# Patient Record
Sex: FEMALE | Race: WHITE | URBAN - METROPOLITAN AREA
[De-identification: names, ages, dates, MRNs, and addresses within clinical notes are randomized per-mention and may not be internally consistent; named-entity substitution may affect disease eponyms.]

---

## 2018-01-18 ENCOUNTER — OFFICE VISIT (OUTPATIENT)
Dept: ORTHOPEDICS | Facility: CLINIC | Age: 63
End: 2018-01-18
Payer: OTHER MISCELLANEOUS

## 2018-01-18 ENCOUNTER — RADIANT APPOINTMENT (OUTPATIENT)
Dept: GENERAL RADIOLOGY | Facility: CLINIC | Age: 63
End: 2018-01-18
Attending: FAMILY MEDICINE
Payer: OTHER MISCELLANEOUS

## 2018-01-18 ENCOUNTER — THERAPY VISIT (OUTPATIENT)
Dept: PHYSICAL THERAPY | Facility: CLINIC | Age: 63
End: 2018-01-18
Payer: OTHER MISCELLANEOUS

## 2018-01-18 VITALS
HEIGHT: 62 IN | BODY MASS INDEX: 23 KG/M2 | DIASTOLIC BLOOD PRESSURE: 80 MMHG | SYSTOLIC BLOOD PRESSURE: 145 MMHG | WEIGHT: 125 LBS

## 2018-01-18 DIAGNOSIS — M62.838 MUSCLE SPASM: ICD-10-CM

## 2018-01-18 DIAGNOSIS — M54.50 ACUTE MIDLINE LOW BACK PAIN WITHOUT SCIATICA: Primary | ICD-10-CM

## 2018-01-18 DIAGNOSIS — M54.5 ACUTE MIDLINE LOW BACK PAIN, WITH SCIATICA PRESENCE UNSPECIFIED: Primary | ICD-10-CM

## 2018-01-18 DIAGNOSIS — M54.5 ACUTE MIDLINE LOW BACK PAIN, WITH SCIATICA PRESENCE UNSPECIFIED: ICD-10-CM

## 2018-01-18 DIAGNOSIS — Z02.6 ENCOUNTER RELATED TO WORKER'S COMPENSATION CLAIM: ICD-10-CM

## 2018-01-18 PROCEDURE — 97112 NEUROMUSCULAR REEDUCATION: CPT | Mod: GP | Performed by: PHYSICAL THERAPIST

## 2018-01-18 PROCEDURE — 97161 PT EVAL LOW COMPLEX 20 MIN: CPT | Mod: GP | Performed by: PHYSICAL THERAPIST

## 2018-01-18 PROCEDURE — 72100 X-RAY EXAM L-S SPINE 2/3 VWS: CPT

## 2018-01-18 PROCEDURE — 99204 OFFICE O/P NEW MOD 45 MIN: CPT | Performed by: FAMILY MEDICINE

## 2018-01-18 PROCEDURE — 97110 THERAPEUTIC EXERCISES: CPT | Mod: GP | Performed by: PHYSICAL THERAPIST

## 2018-01-18 RX ORDER — METHYLPREDNISOLONE 4 MG
TABLET, DOSE PACK ORAL
Qty: 21 TABLET | Refills: 0 | Status: SHIPPED | OUTPATIENT
Start: 2018-01-18 | End: 2018-01-26

## 2018-01-18 RX ORDER — CYCLOBENZAPRINE HCL 5 MG
5 TABLET ORAL
Qty: 15 TABLET | Refills: 0 | Status: SHIPPED | OUTPATIENT
Start: 2018-01-18

## 2018-01-18 RX ORDER — SIMVASTATIN 40 MG
TABLET ORAL
COMMUNITY
Start: 2018-01-03

## 2018-01-18 RX ORDER — METOPROLOL SUCCINATE 100 MG/1
TABLET, EXTENDED RELEASE ORAL
COMMUNITY
Start: 2017-11-01

## 2018-01-18 NOTE — PROGRESS NOTES
ASSESSMENT & PLAN    1. Acute midline low back pain, with sciatica presence unspecified    2. Muscle spasm    3. Encounter related to worker's compensation claim      Physical therapy: [facility] - [phone]  Discussed exam - does not appear discogenic  Rx for medrol/steroid. Take in the AM and with food. No other anti-inflammatories (Ibuprofen, Advil, Aleve, Motrin) while you're taking this.  Rx for Flexeril. Take at night. No alcohol / driving while taking.  Letter for work: light duty - no lifting, bending or patient transfers    Follow up in one week. Call direct clinic number [[phone]] at any time with questions or concerns.    -----    SUBJECTIVE  Taisha Jaimes is a/an 62 year old female who is seen as a self referral for evaluation of low back pain. The patient is seen by themselves.    Onset: 5 day(s) ago. Patient describes injury as she was reaching around a patient to dispose of a medication in the sharp container and felt a pain in her back  Location of Pain:  Midline low back pain with radiation to the abdomen  Rating of Pain at worst: 5/10  Rating of Pain Currently: 4/10  Worsened by: sitting, lifting, bending  Better with: standing  Treatments tried: Tylenol and epsom salt soaks  Quality: aching, radiating  Red flags: Weakness: No, bowel/bladder loss: No, foot drop: No  Associated symptoms: tingling in bilateral lateral thigh  Orthopedic history: NO  Relevant surgical history: NO  Patient Social History: works as an RN    [place] [facility] [place]     Patient's past medical, surgical, social, and family histories were reviewed today and no changes are noted.    REVIEW OF SYSTEMS:  10 point ROS is negative other than symptoms noted above in HPI, Past Medical History or as stated below  Constitutional: NEGATIVE for fever, chills, change in weight  Skin: NEGATIVE for worrisome rashes, moles or lesions  GI/: NEGATIVE for bowel or bladder changes  Neuro:  "NEGATIVE for weakness, dizziness or paresthesias    OBJECTIVE:  /80  Ht 5' 2\" (1.575 m)  Wt 125 lb (56.7 kg)  BMI 22.86 kg/m2   General: healthy, alert and in no distress  HEENT: no scleral icterus or conjunctival erythema  Skin: no suspicious lesions or rash. No jaundice.  CV: no pedal edema  Resp: normal respiratory effort without conversational dyspnea   Psych: normal mood and affect  Gait: normal steady gait with appropriate coordination and balance  Neuro: normal light touch sensory exam of the bilateral lower extremities.  DTR's 2+ patella and achilles bilaterally.  MSK:  THORACIC/LUMBAR SPINE  Inspection:    No gross deformity/asymmetry  Palpation:    Tender about the lumbar facet joints, left para lumbar muscles, right para lumbar muscles, right SI joint and right sciatic notch. Otherwise remainder of landmarks are nontender.  Range of Motion:     Lumbar flexion limited by pain    Lumbar extension limited by pain  Strength:    able to heel walk, able to toe walk, quadriceps 5/5, hamstrings 5/5, gastrocsoleus 5/5, tibialis anterior 5/5, extensor hallicus longus 5/5  Special Tests:    Negative: slump test (bilateral)    Independent visualization of the below image:  XR LUMBAR SPINE  Well preserved disc space, no spondy.  Final radiology read pending    Patient's conditions were thoroughly discussed during today's visit with greater than 50% of the visit spent counseling the patient with total time spent face-to-face with the patient being 20 minutes.    Aliya Salas, DO [facility]    "

## 2018-01-18 NOTE — LETTER
[facility]  [address]  [address]  [place] [address]  Phone: [phone]  Fax: [phone]    January 18, 2018    Taisha Jaimes  1955  [address]  [place] [address]    Employer: [employer]  Date of Accident: 1/16/18  Work Related:  yes    To Whom It May Concern:    Taisha was seen in clinic today for a back pain related to a work injury. .     Taisha is ABLE to return to work with the following restrictions that are valid from 1/18/18 to 1/29/18:    A) Bend: Not at all (0 hours)  B) Squat: Not at all (0 hours)  C) Walk/Stand: Occasionally (1-3 hours)  D) Reach Above Shoulders: Occasionally (1-3 hours)  E) Lift, carry, push, and pull no more than:  0-10 lbs.    Other restrictions: No patient transfers    Taisha will schedule to follow up in clinic prior to the above mentioned end date for re-evaluation of and review of work restrictions.    Please feel free to contact myself or my Clinic Coordinator, Roel Mahoney, with any questions or concerns at the above number.    Sincerely,        Aliya Salas DO, [facility] Mahoney ATC on behalf of Dr. Salas

## 2018-01-18 NOTE — LETTER
1/18/2018         RE: Taisha Jaimes  [address]  [place] [address]        Dear Colleague,    Thank you for referring your patient, Taisha Jaimes, to the [facility]. Please see a copy of my visit note below.    ASSESSMENT & PLAN    1. Acute midline low back pain, with sciatica presence unspecified    2. Muscle spasm    3. Encounter related to worker's compensation claim      Physical therapy: [facility] - [phone]  Discussed exam - does not appear discogenic  Rx for medrol/steroid. Take in the AM and with food. No other anti-inflammatories (Ibuprofen, Advil, Aleve, Motrin) while you're taking this.  Rx for Flexeril. Take at night. No alcohol / driving while taking.  Letter for work: light duty - no lifting, bending or patient transfers    Follow up in one week. Call direct clinic number [[phone]] at any time with questions or concerns.    -----    SUBJECTIVE  Taisha Jaimes is a/an 62 year old female who is seen as a self referral for evaluation of low back pain. The patient is seen by themselves.    Onset: 5 day(s) ago. Patient describes injury as she was reaching around a patient to dispose of a medication in the sharp container and felt a pain in her back  Location of Pain:  Midline low back pain with radiation to the abdomen  Rating of Pain at worst: 5/10  Rating of Pain Currently: 4/10  Worsened by: sitting, lifting, bending  Better with: standing  Treatments tried: Tylenol and epsom salt soaks  Quality: aching, radiating  Red flags: Weakness: No, bowel/bladder loss: No, foot drop: No  Associated symptoms: tingling in bilateral lateral thigh  Orthopedic history: NO  Relevant surgical history: NO  Patient Social History: works as an RN    Was seen at [facility] over the weekend.     Patient's past medical, surgical, social, and family histories were reviewed today and no changes are noted.    REVIEW OF SYSTEMS:  10 point ROS is negative  "other than symptoms noted above in HPI, Past Medical History or as stated below  Constitutional: NEGATIVE for fever, chills, change in weight  Skin: NEGATIVE for worrisome rashes, moles or lesions  GI/: NEGATIVE for bowel or bladder changes  Neuro: NEGATIVE for weakness, dizziness or paresthesias    OBJECTIVE:  /80  Ht 5' 2\" (1.575 m)  Wt 125 lb (56.7 kg)  BMI 22.86 kg/m2   General: healthy, alert and in no distress  HEENT: no scleral icterus or conjunctival erythema  Skin: no suspicious lesions or rash. No jaundice.  CV: no pedal edema  Resp: normal respiratory effort without conversational dyspnea   Psych: normal mood and affect  Gait: normal steady gait with appropriate coordination and balance  Neuro: normal light touch sensory exam of the bilateral lower extremities.  DTR's 2+ patella and achilles bilaterally.  MSK:  THORACIC/LUMBAR SPINE  Inspection:    No gross deformity/asymmetry  Palpation:    Tender about the lumbar facet joints, left para lumbar muscles, right para lumbar muscles, right SI joint and right sciatic notch. Otherwise remainder of landmarks are nontender.  Range of Motion:     Lumbar flexion limited by pain    Lumbar extension limited by pain  Strength:    able to heel walk, able to toe walk, quadriceps 5/5, hamstrings 5/5, gastrocsoleus 5/5, tibialis anterior 5/5, extensor hallicus longus 5/5  Special Tests:    Negative: slump test (bilateral)    Independent visualization of the below image:  XR LUMBAR SPINE  Well preserved disc space, no spondy.  Final radiology read pending    Patient's conditions were thoroughly discussed during today's visit with greater than 50% of the visit spent counseling the patient with total time spent face-to-face with the patient being 20 minutes.    Aliya Salas DO [facility]      Again, thank you for allowing me to participate in the care of your patient.        Sincerely,        Aliya Salas, DO    "

## 2018-01-18 NOTE — LETTER
[facility]  [address]  [address]  [place] [address]  Phone: [phone]  Fax: [phone]    January 18, 2018    Taisha Jaimes  1955  [address]  [place] [address]    Employer: [employer]  Date of Accident: 1/16/18  Work Related:  yes    To Whom It May Concern:    Taisha was seen in clinic today for a back pain related to a work injury. .     Taisha is ABLE to return to work with the following restrictions that are valid from 1/18/15 to 1/29/18:    A) Bend: Not at all (0 hours)  B) Squat: Not at all (0 hours)  C) Walk/Stand: Occasionally (1-3 hours)  D) Reach Above Shoulders: Occasionally (1-3 hours)  E) Lift, carry, push, and pull no more than:  0-10 lbs.    Other restrictions: No patient transfers    Taisha will schedule to follow up in clinic prior to the above mentioned end date for re-evaluation of and review of work restrictions.    Please feel free to contact myself or my Clinic Coordinator, Roel Mahoney, with any questions or concerns at the above number.    Sincerely,        Aliya Salas DO, [employer] Mahoney ATC on behalf of Dr. Salas

## 2018-01-18 NOTE — PATIENT INSTRUCTIONS
1. Acute midline low back pain, with sciatica presence unspecified    2. Muscle spasm    3. Encounter related to worker's compensation claim      Physical therapy: [facility] - [phone]  Discussed exam - does not appear related to a herniated disc  Rx for medrol/steroid. Take in the AM and with food. No other anti-inflammatories (Ibuprofen, Advil, Aleve, Motrin) while you're taking this.  Rx for Flexeril. Take at night. No alcohol / driving while taking.  Letter for work: light duty - no lifting, bending or patient transfers    Follow up in one week. Call direct clinic number [[phone]] at any time with questions or concerns.

## 2018-01-18 NOTE — MR AVS SNAPSHOT
"              After Visit Summary   1/18/2018    Taisha Jaimes    MRN: [id number]           Patient Information     Date Of Birth          1955        Visit Information        Provider Department      1/18/2018 1:30 PM María Elena Houston, PT [facility]        Today's Diagnoses     Acute midline low back pain without sciatica    -  1       Follow-ups after your visit        Your next 10 appointments already scheduled     Jan 26, 2018 11:20 AM CST   MEDSPINE RTN with Aliya Salas,    [facility] ([facility])    [address]  [address]  [place] [address]   [phone]            Jan 26, 2018 12:50 PM CST   [facility] Sp[facility] with SHIRLENE Girard PT ([facility]  )    [address]  [address]  [place] [address] [phone]              Who to contact     If you have questions or need follow up information about today's clinic visit or your schedule please contact [facility] directly at [phone].  Normal or non-critical lab and imaging results will be communicated to you by FlowMetrichart, letter or phone within 4 business days after the clinic has received the results. If you do not hear from us within 7 days, please contact the clinic through FlowMetrichart or phone. If you have a critical or abnormal lab result, we will notify you by phone as soon as possible.  Submit refill requests through Hello [employer] or call your pharmacy and they will forward the refill request to us. Please allow 3 business days for your refill to be completed.          Additional Information About Your Visit        FlowMetrichart Information     Hello Music lets you send messages to your doctor, view your test results, renew your prescriptions, schedule appointments and more. To sign up, go to [email or web address] . Click on \"Log in\" on the left side of the screen, which will take you to the Welcome page. Then click on \"Sign up Now\" on the right side of the " page.     You will be asked to enter the access code listed below, as well as some personal information. Please follow the directions to create your username and password.     Your access code is: [email or web address]  Expires: 2018  2:43 PM     Your access code will  in 90 days. If you need help or a new code, please call your [facility] clinic or [phone].        Care EveryWhere ID     This is your Care EveryWhere ID. This could be used by other organizations to access your [facility] medical records  [id number]         Blood Pressure from Last 3 Encounters:   18 145/80    Weight from Last 3 Encounters:   18 56.7 kg (125 lb)              We Performed the Following     [facility] PT EVAL, LOW COMPLEXITY     [facility] INITIAL EVAL REPORT     NEUROMUSCULAR RE-EDUCATION     THERAPEUTIC EXERCISES          Today's Medication Changes          These changes are accurate as of 18 11:59 PM.  If you have any questions, ask your nurse or doctor.               Start taking these medicines.        Dose/Directions    cyclobenzaprine 5 MG tablet   Commonly known as:  FLEXERIL   Used for:  Muscle spasm   Started by:  Aliya Salas DO        Dose:  5 mg   Take 1 tablet (5 mg) by mouth nightly as needed for muscle spasms   Quantity:  15 tablet   Refills:  0       methylPREDNISolone 4 MG tablet   Commonly known as:  MEDROL DOSEPAK   Used for:  Acute midline low back pain, with sciatica presence unspecified, Muscle spasm   Started by:  Aliya Salas DO        Follow package instructions   Quantity:  21 tablet   Refills:  0            Where to get your medicines      These medications were sent to [facility] [address] [address]     Phone:  [phone]     cyclobenzaprine 5 MG tablet    methylPREDNISolone 4 MG tablet                Primary Care Provider Office Phone # Fax #    Rylee James [facility] [phone]        [address] [address]        Equal Access to Services     STEPHANIE RASHEED : Hadii aad ku hadnorahtiny Saulali, waizabelda annmarievanessaha, qajulianata hetalkirbyradha millan, francisco demellyjenny rossi. So [facility] [phone].    ATENCIÓN: Si habla español, tiene a morales disposición servicios gratuitos de asistencia lingüística. [facility] [phone].    We comply with applicable federal civil rights laws and [place] laws. We do not discriminate on the basis of race, color, national origin, age, disability, sex, sexual orientation, or gender identity.            Thank you!     Thank you for choosing [facility]  for your care. Our goal is always to provide you with excellent care. Hearing back from our patients is one way we can continue to improve our services. Please take a few minutes to complete the written survey that you may receive in the mail after your visit with us. Thank you!             Your Updated Medication List - Protect others around you: Learn how to safely use, store and throw away your medicines at www.disposemymeds.org.          This list is accurate as of 1/18/18 11:59 PM.  Always use your most recent med list.                   Brand Name Dispense Instructions for use Diagnosis    cyclobenzaprine 5 MG tablet    FLEXERIL    15 tablet    Take 1 tablet (5 mg) by mouth nightly as needed for muscle spasms    Muscle spasm       methylPREDNISolone 4 MG tablet    MEDROL DOSEPAK    21 tablet    Follow package instructions    Acute midline low back pain, with sciatica presence unspecified, Muscle spasm       metoprolol succinate 100 MG 24 hr tablet    TOPROL-XL          ranitidine 300 MG tablet    ZANTAC          simvastatin 40 MG tablet    ZOCOR

## 2018-01-18 NOTE — LETTER
[facility]  [address]  [address]  [place] [address]  Phone: [phone]  Fax: [phone]    January 18, 2018    Taisha Jaimes  1955  [address]  [place] [address]    Employer: [employer]  Date of Accident: 1/13/18  Work Related:  yes    To Whom It May Concern:    Taisha was seen in clinic today for back pain related to a work injury.    Taisha is ABLE to return to work with the following restrictions that are valid from 1/18/18 to 1/29/18:    A) Bend: Not at all (0 hours)  B) Squat: Not at all (0 hours)  C) Walk/Stand: Occasionally (1-3 hours)  D) Reach Above Shoulders: Occasionally (1-3 hours)  E) Lift, carry, push, and pull no more than:  0-10 lbs.    Other restrictions: No patient transfers    Taisha will schedule follow up in clinic prior to the above mentioned end date for re-evaluation and review of work restrictions.    Please feel free to contact myself or my Clinic Coordinator, Roel Mahoney, with any questions or concerns at the above number.    Sincerely,        Aliya Salas DO, [employer] Mahoney ATC on behalf of Dr. Salas

## 2018-01-18 NOTE — MR AVS SNAPSHOT
After Visit Summary   1/18/2018    Taisha Jaimes    MRN: [id number]           Patient Information     Date Of Birth          1955        Visit Information        Provider Department      1/18/2018 1:40 PM Aliya Salas,  [facility]        Today's Diagnoses     Acute midline low back pain, with sciatica presence unspecified    -  1    Muscle spasm        Encounter related to worker's compensation claim          Care Instructions    1. Acute midline low back pain, with sciatica presence unspecified    2. Muscle spasm    3. Encounter related to worker's compensation claim      Physical therapy: [facility] - [phone]  Discussed exam - does not appear related to a herniated disc  Rx for medrol/steroid. Take in the AM and with food. No other anti-inflammatories (Ibuprofen, Advil, Aleve, Motrin) while you're taking this.  Rx for Flexeril. Take at night. No alcohol / driving while taking.  Letter for work: light duty - no lifting, bending or patient transfers    Follow up in one week. Call direct clinic number [[phone]] at any time with questions or concerns.              Follow-ups after your visit        Additional Services     [facility] PT, HAND, AND CHIROPRACTIC REFERRAL       **This order will print in the [facility] Scheduling Office**    Physical Therapy, Hand Therapy and Chiropractic Care are available through:    *[facility]  *[facility]  *[facility]    Call one number to schedule at any of the above locations: [phone].    Your provider has referred you to: Physical Therapy at [facility] or [facility]    Indication/Reason for Referral: Midline low Back Pain - no radic  Onset of Illness: see chart  Therapy Orders: Evaluate and Treat  Special Programs: None  Special Request: MDT PT please - María Elena Houston, Xuan Lea, Loulou Franz or Xuan Cintron      Additional Comments for the Therapist  "or Chiropractor: Formal physical therapy - specific exercises to include centralization with flexion/extension activities with mobilization/manipulation and core stabilization with use of modalities (ie ice, ultrasound, electrical stimulation, etc.) as needed with home exercise prescription.    Please be aware that coverage of these services is subject to the terms and limitations of your health insurance plan.  Call member services at your health plan with any benefit or coverage questions.      Please bring the following to your appointment:    *Your personal calendar for scheduling future appointments  *Comfortable clothing                  Who to contact     If you have questions or need follow up information about today's clinic visit or your schedule please contact [facility]IC[facility] directly at [phone].  Normal or non-critical lab and imaging results will be communicated to you by Tradesparqhart, letter or phone within 4 business days after the clinic has received the results. If you do not hear from us within 7 days, please contact the clinic through Tradesparqhart or phone. If you have a critical or abnormal lab result, we will notify you by phone as soon as possible.  Submit refill requests through EffiCity or call your pharmacy and they will forward the refill request to us. Please allow 3 business days for your refill to be completed.          Additional Information About Your Visit        Tradesparqhart Information     EffiCity lets you send messages to your doctor, view your test results, renew your prescriptions, schedule appointments and more. To sign up, go to [email or web address] . Click on \"Log in\" on the left side of the screen, which will take you to the Welcome page. Then click on \"Sign up Now\" on the right side of the page.     You will be asked to enter the access code listed below, as well as some personal information. Please follow the directions to create your username and password.   " "  Your access code is: [email or web address]  Expires: 2018  2:43 PM     Your access code will  in 90 days. If you need help or a new code, please call your [place] clinic or [phone].        Care EveryWhere ID     This is your Care EveryWhere ID. This could be used by other organizations to access your [place] medical records  [id number]        Your Vitals Were     Height BMI (Body Mass Index)                5' 2\" (1.575 m) 22.86 kg/m2           Blood Pressure from Last 3 Encounters:   18 145/80    Weight from Last 3 Encounters:   18 125 lb (56.7 kg)              We Performed the Following     [facility] PT, HAND, AND CHIROPRACTIC REFERRAL          Today's Medication Changes          These changes are accurate as of: 18  2:45 PM.  If you have any questions, ask your nurse or doctor.               Start taking these medicines.        Dose/Directions    cyclobenzaprine 5 MG tablet   Commonly known as:  FLEXERIL   Used for:  Muscle spasm   Started by:  Aliya Salas DO        Dose:  5 mg   Take 1 tablet (5 mg) by mouth nightly as needed for muscle spasms   Quantity:  15 tablet   Refills:  0       methylPREDNISolone 4 MG tablet   Commonly known as:  MEDROL DOSEPAK   Used for:  Acute midline low back pain, with sciatica presence unspecified, Muscle spasm   Started by:  Aliya Salas DO        Follow package instructions   Quantity:  21 tablet   Refills:  0            Where to get your medicines      These medications were sent to [facility] [address] [address]     Phone:  [phone]     cyclobenzaprine 5 MG tablet    methylPREDNISolone 4 MG tablet                Primary Care Provider Office Phone # Fax #    [facility] [phone] [address] [facility] [address]        Equal Access to Services     STEPHANIE RASHEED AH: blanca Montalvo, " karel hetalal gilmerfrancisco horowitz damienin hayaan adeeg kharash la'aan ah. So [facility] [phone].    ATENCIÓN: Si jenni malik, tiene a morales disposición servicios gratuitos de asistencia lingüística. Gael al [phone].    We comply with applicable federal civil rights laws and [place] laws. We do not discriminate on the basis of race, color, national origin, age, disability, sex, sexual orientation, or gender identity.            Thank you!     Thank you for choosing [facility]  for your care. Our goal is always to provide you with excellent care. Hearing back from our patients is one way we can continue to improve our services. Please take a few minutes to complete the written survey that you may receive in the mail after your visit with us. Thank you!             Your Updated Medication List - Protect others around you: Learn how to safely use, store and throw away your medicines at www.disposemymeds.org.          This list is accurate as of: 1/18/18  2:45 PM.  Always use your most recent med list.                   Brand Name Dispense Instructions for use Diagnosis    cyclobenzaprine 5 MG tablet    FLEXERIL    15 tablet    Take 1 tablet (5 mg) by mouth nightly as needed for muscle spasms    Muscle spasm       methylPREDNISolone 4 MG tablet    MEDROL DOSEPAK    21 tablet    Follow package instructions    Acute midline low back pain, with sciatica presence unspecified, Muscle spasm       metoprolol succinate 100 MG 24 hr tablet    TOPROL-XL          ranitidine 300 MG tablet    ZANTAC          simvastatin 40 MG tablet    ZOCOR

## 2018-01-19 ENCOUNTER — THERAPY VISIT (OUTPATIENT)
Dept: PHYSICAL THERAPY | Facility: CLINIC | Age: 63
End: 2018-01-19
Payer: OTHER MISCELLANEOUS

## 2018-01-19 DIAGNOSIS — M54.50 ACUTE MIDLINE LOW BACK PAIN WITHOUT SCIATICA: Primary | ICD-10-CM

## 2018-01-19 PROCEDURE — 97110 THERAPEUTIC EXERCISES: CPT | Mod: GP | Performed by: PHYSICAL THERAPIST

## 2018-01-19 PROCEDURE — 97530 THERAPEUTIC ACTIVITIES: CPT | Mod: GP | Performed by: PHYSICAL THERAPIST

## 2018-01-19 NOTE — PROGRESS NOTES
[facility] Initial Evaluation -- Lumbar    Date: January 18, 2018  Taisha Jaimes is a 62 year old female with a lumbar condition.   Referral: Dr. Salas  Employment/Workability(Lost work days):  2  Formal Exerciser (Y/N):  Y  Leisure Mechanical Stresses: Walk, TM  Functional disability score (HANNAH/STarT Back):  See flow sheet  Visual Analog Score (VAS 0-10): 5/10  Patient goals/expectations:      HISTORY:    Present symptoms: central low back radiating to front/cramping  Pain quality (Sharp/shooting/stabbing/aching/burning/cramping):  cramping  Paresthesia (yes/no):  Yes bilateral thigh    DFO < 22 days (Y/N):  Y, 5 days.   Symptoms (improving/unchanging/worsening):  worsening.   Symptoms commenced as a result of: reaching up while leaning over     Symptoms at onset(back/thigh/leg): back  Constant symptoms(back/thigh/leg): back  Intermittent symptoms(back/thigh/leg): thighs/tingling    Symptoms are made worse with the following: Always Bending, Always Sitting, Time of day - No effect and Other - Lifting   Symptoms are made better with the following: Always Walking and Always On the move    Disturbed sleep (yes/no):  yes Sleeping postures (prone/sup/side R/L): sides    Previous episodes (0/1-5/6-10/11+): 0 Year of first episode: n/a    Previous history: none  Previous treatments: none      Specific Questions:  Cough/Sneeze/Strain (Pos/Neg): positive  Bowel/Bladder (Normal/Abnormal): normal  Gait (Normal/Abnormal): normal  Medications (Nil/NSAIDS/Analg/Steroids/Anticoag/Other):  NSAIDS, Muscle relaxants and Steroids  Medical allergies:  none  General Health (Excellent/Good/Fair/Poor):  good  Pertinent medical history:  Osteoporosis, Cancer, High blood pressure and Menopausal  Imaging (NA/Xray/MRI):  xrays  Recent or major surgery (Yes/No):  no  Night pain (Yes/No): n/a  Accidents (Yes/No): no  Unexplained weight loss (Yes/No): no  Barriers at home: none  Other red flags:  none    EXAMINATION    Posture:   Sitting(Good/Fair/Poor): fair/good  Standing(Good/Fair/Poor):fair  Lordosis (Red/Acc/Normal): acc  Correction of posture(Better/Worse/NE): better    Lateral Shift (Right/Left/Nil): nil  Relevant (Yes/No):  no  Other Observations: none    Neurological:    Motor Deficit:  n/a  Reflexes:  n/a  Sensory Deficit:  n/a  Dural Signs:  negative    Movement Loss:   Bernardino Mod Min Nil Pain   Flexion  x   Poor curve reversal   Extension   x     Side Gliding R   x     Side Gliding L   x       Test Movements:   During: produces, abolishes, increases, decreases, no effect, centralizing, peripheralizing   After: better, worse, no better, no worse, no effect, centralized, peripheralized    Pre-test Symptoms Standing:    Symptoms During Symptoms After ROM increased ROM decreased No Effect   FIS        Rep FIS        EIS        Rep EIS        Pre-test Symptoms Lying: central LBP    Symptoms During Symptoms After ROM increased ROM decreased No Effect   BERYL        Rep BERYL        EIL Decreases    Better         Rep EIL Decreases    Better      x   If required Pre-test Symptoms: not tested   Symptoms During Symptoms After ROM increased ROM decreased No Effect   SGIS - R        Rep SGIS - R        SGIS - L        Rep SGIS - L          Static Tests:  Sitting Slouched:    Sitting erect:    Standing Slouched   Standing erect:    Lying prone in extension:   Long sitting:      Other Tests:     Provisional Classification:  Inconclusive/Other - Mechanically Inconclusive    Principle of Management:  Education:  posture  Equipment provided:  Lumbar roll  Mechanical therapy (Y/N):  ?  Extension principle:  Rep EIL x 10/2 hrs  Lateral Principle:    Flexion principle:    Other:      ASSESSMENT/PLAN    Patient is a 62 year old female with lumbar complaints.    Patient has the following significant findings with corresponding treatment plan.                Diagnosis 1:  Mechanical LBP  Pain -  manual therapy, self  management, education, directional preference exercise and home program  Decreased ROM/flexibility - manual therapy and therapeutic exercise  Decreased function - therapeutic activities  Impaired posture - neuro re-education    Therapy Evaluation Codes:   1) History comprised of:   Personal factors that impact the plan of care:      None.    Comorbidity factors that impact the plan of care are:      None.     Medications impacting care: Steroids.  2) Examination of Body Systems comprised of:   Body structures and functions that impact the plan of care:      Lumbar spine.   Activity limitations that impact the plan of care are:      Bending, Lifting, Working and Sleeping.  3) Clinical presentation characteristics are:   Evolving/Changing.  4) Decision-Making    Low complexity using standardized patient assessment instrument and/or measureable assessment of functional outcome.  Cumulative Therapy Evaluation is: Low complexity.    Previous and current functional limitations:  (See Goal Flow Sheet for this information)    Short term and Long term goals: (See Goal Flow Sheet for this information)     Communication ability:  Patient appears to be able to clearly communicate and understand verbal and written communication and follow directions correctly.  Treatment Explanation - The following has been discussed with the patient:   RX ordered/plan of care  Anticipated outcomes  Possible risks and side effects  This patient would benefit from PT intervention to resume normal activities.   Rehab potential is good.    Frequency:  1 X week, once daily  Duration:  for 6 weeks  Discharge Plan:  Achieve all LTG.  Independent in home treatment program.  Reach maximal therapeutic benefit.    Please refer to the daily flowsheet for treatment today, total treatment time and time spent performing 1:1 timed codes.

## 2018-01-24 PROBLEM — M54.50 ACUTE LOW BACK PAIN: Status: ACTIVE | Noted: 2018-01-24

## 2018-01-26 ENCOUNTER — THERAPY VISIT (OUTPATIENT)
Dept: PHYSICAL THERAPY | Facility: CLINIC | Age: 63
End: 2018-01-26
Payer: OTHER MISCELLANEOUS

## 2018-01-26 ENCOUNTER — OFFICE VISIT (OUTPATIENT)
Dept: ORTHOPEDICS | Facility: CLINIC | Age: 63
End: 2018-01-26
Payer: OTHER MISCELLANEOUS

## 2018-01-26 VITALS
DIASTOLIC BLOOD PRESSURE: 83 MMHG | WEIGHT: 125 LBS | BODY MASS INDEX: 23 KG/M2 | HEIGHT: 62 IN | SYSTOLIC BLOOD PRESSURE: 145 MMHG

## 2018-01-26 DIAGNOSIS — Z02.6 ENCOUNTER RELATED TO WORKER'S COMPENSATION CLAIM: ICD-10-CM

## 2018-01-26 DIAGNOSIS — M54.5 ACUTE MIDLINE LOW BACK PAIN, WITH SCIATICA PRESENCE UNSPECIFIED: Primary | ICD-10-CM

## 2018-01-26 DIAGNOSIS — M54.50 ACUTE MIDLINE LOW BACK PAIN WITHOUT SCIATICA: ICD-10-CM

## 2018-01-26 PROCEDURE — 99213 OFFICE O/P EST LOW 20 MIN: CPT | Performed by: FAMILY MEDICINE

## 2018-01-26 PROCEDURE — 97110 THERAPEUTIC EXERCISES: CPT | Mod: GP | Performed by: PHYSICAL THERAPIST

## 2018-01-26 PROCEDURE — 97530 THERAPEUTIC ACTIVITIES: CPT | Mod: GP | Performed by: PHYSICAL THERAPIST

## 2018-01-26 NOTE — LETTER
"    1/26/2018         RE: Taisha Jaimes  [address]  [place] [address]        Dear Colleague,    Thank you for referring your patient, Taisha Jaimes, to the [facility]. Please see a copy of my visit note below.    ASSESSMENT & PLAN    1. Acute midline low back pain, with sciatica presence unspecified    2. Encounter related to worker's compensation claim      Exam and function have improved after 2 PT visits. Another scheduled for today.  Discuss with María Elena about core strengthening / stability for long term use  Continue home exercise  Letter for work with gradual return to full duty    Follow up as needed. Call direct clinic number [[phone]] at any time with questions or concerns.    -----    SUBJECTIVE:  Taisha Jaimes is a 62 year old female who is seen in follow-up for acute low back injury.They were last seen 1/18/2018. The patient is seen by themselves.    Since their last visit reports 95% improvement. They indicate that their current pain level is 0/10. They have tried other medications: Prednisone (Medrol) and Cyclobenzaprine (Flexeril) , physical therapy (2 visits) and work restrictions.  Seeing María Elena today.  Work restrictions were honored from before.  Working tonight, Monday, Tuesday and then following Fri, Sat, Sunday. Felt sore in upper back during shift two night ago - no lifting, no reaching heavy objects. Doing med administration, hill, light duty activities.  Being compliant with home exercise.    Patient's past medical, surgical, social, and family histories were reviewed today and no changes are noted.    REVIEW OF SYSTEMS:  Constitutional: NEGATIVE for fever, chills, change in weight  Skin: NEGATIVE for worrisome rashes, moles or lesions  GI/: NEGATIVE for bowel or bladder changes  Neuro: NEGATIVE for weakness, dizziness or paresthesias    OBJECTIVE:  /83  Ht 5' 2\" (1.575 m)  Wt 125 lb (56.7 kg)  BMI 22.86 kg/m2   General: healthy, alert " and in no distress  HEENT: no scleral icterus or conjunctival erythema  Skin: no suspicious lesions or rash. No jaundice.  CV:  no pedal edema  Resp: normal respiratory effort without conversational dyspnea   Psych: normal mood and affect  Gait: normal steady gait with appropriate coordination and balance  Neuro: normal light touch sensory exam of the extremities.    MSK:  THORACIC/LUMBAR SPINE  Inspection:    No gross deformity/asymmetry  Palpation:     Mildly tender about the R lumbar facet joints (L4/5). Otherwise no longer tender over left para lumbar muscles, right para lumbar muscles, right SI joint and right sciatic notch. Otherwise remainder of landmarks are nontender.  Range of Motion:     Lumbar flexion full    Lumbar extension full    Patient's conditions were thoroughly discussed during today's visit with greater than 50% of the visit spent counseling the patient with total time spent face-to-face with the patient being 15 minutes.    Aliya Salas DO [facility]          Again, thank you for allowing me to participate in the care of your patient.        Sincerely,        Aliya Salas DO

## 2018-01-26 NOTE — MR AVS SNAPSHOT
After Visit Summary   1/26/2018    Taisha Jaimes    MRN: [id number]           Patient Information     Date Of Birth          1955        Visit Information        Provider Department      1/26/2018 11:20 AM Aliya Salas DO [facility]        Today's Diagnoses     Acute midline low back pain, with sciatica presence unspecified    -  1    Encounter related to worker's compensation claim          Care Instructions    1. Acute midline low back pain, with sciatica presence unspecified    2. Encounter related to worker's compensation claim      Discuss with María Elena about core strengthening / stability for long term use  Continue home exercise  Letter for work with gradual return to full duty    Follow up as needed. Call direct clinic number [[phone]] at any time with questions or concerns.                    Follow-ups after your visit        Your next 10 appointments already scheduled     Jan 26, 2018 12:50 PM CST   [facility] Spine with María Elena Houston, PT   [facility] ([facility]  )    [address] [address]   [phone]              Who to contact     If you have questions or need follow up information about today's clinic visit or your schedule please contact [facility] directly at [phone].  Normal or non-critical lab and imaging results will be communicated to you by MyChart, letter or phone within 4 business days after the clinic has received the results. If you do not hear from us within 7 days, please contact the clinic through MyChart or phone. If you have a critical or abnormal lab result, we will notify you by phone as soon as possible.  Submit refill requests through Enteye or call your pharmacy and they will forward the refill request to us. Please allow 3 business days for your refill to be completed.          Additional Information About Your Visit        MyChart Information     Enteye lets you  "send messages to your doctor, view your test results, renew your prescriptions, schedule appointments and more. To sign up, go to [email or web address] . Click on \"Log in\" on the left side of the screen, which will take you to the Welcome page. Then click on \"Sign up Now\" on the right side of the page.     You will be asked to enter the access code listed below, as well as some personal information. Please follow the directions to create your username and password.     Your access code is: [email or web address]  Expires: 2018  2:43 PM     Your access code will  in 90 days. If you need help or a new code, please call your [place] clinic or [phone].        Care EveryWhere ID     This is your [facility] EveryWhere ID. This could be used by other organizations to access your [place] medical records  [id number]        Your Vitals Were     Height BMI (Body Mass Index)                5' 2\" (1.575 m) 22.86 kg/m2           Blood Pressure from Last 3 Encounters:   18 145/83   18 145/80    Weight from Last 3 Encounters:   18 125 lb (56.7 kg)   18 125 lb (56.7 kg)              Today, you had the following     No orders found for display         Today's Medication Changes          These changes are accurate as of 18 11:49 AM.  If you have any questions, ask your nurse or doctor.               Stop taking these medicines if you haven't already. Please contact your care team if you have questions.     methylPREDNISolone 4 MG tablet   Commonly known as:  MEDROL DOSEPAK   Stopped by:  Aliya Salas DO                    Primary Care Provider Office Phone # Fax #    Rylee [facility] [phone] [address] [address]        Equal Access to Services     [facility] WILI : Sherin Proctor, blanca godoy, qafrancisco fuentes. [facility] [phone].    ATENCIÓN: Si gonzalo bang " disposición servicios gratuitos de asistencia lingüística. Gael solorzano [phone].    We comply with applicable federal civil rights laws and [place] laws. We do not discriminate on the basis of race, color, national origin, age, disability, sex, sexual orientation, or gender identity.            Thank you!     Thank you for choosing [facility]TS [facility]ICINE  for your care. Our goal is always to provide you with excellent care. Hearing back from our patients is one way we can continue to improve our services. Please take a few minutes to complete the written survey that you may receive in the mail after your visit with us. Thank you!             Your Updated Medication List - Protect others around you: Learn how to safely use, store and throw away your medicines at www.disposemymeds.org.          This list is accurate as of 1/26/18 11:49 AM.  Always use your most recent med list.                   Brand Name Dispense Instructions for use Diagnosis    cyclobenzaprine 5 MG tablet    FLEXERIL    15 tablet    Take 1 tablet (5 mg) by mouth nightly as needed for muscle spasms    Muscle spasm       metoprolol succinate 100 MG 24 hr tablet    TOPROL-XL          ranitidine 300 MG tablet    ZANTAC          simvastatin 40 MG tablet    ZOCOR

## 2018-01-26 NOTE — PROGRESS NOTES
"ASSESSMENT & PLAN    1. Acute midline low back pain, with sciatica presence unspecified    2. Encounter related to worker's compensation claim      Exam and function have improved after 2 PT visits. Another scheduled for today.  Discuss with María Elena about core strengthening / stability for long term use  Continue home exercise  Letter for work with gradual return to full duty    Follow up as needed. Call direct clinic number [[phone]] at any time with questions or concerns.    -----    SUBJECTIVE:  Taisha Jaimes is a 62 year old female who is seen in follow-up for acute low back injury.They were last seen 1/18/2018. The patient is seen by themselves.    Since their last visit reports 95% improvement. They indicate that their current pain level is 0/10. They have tried other medications: Prednisone (Medrol) and Cyclobenzaprine (Flexeril) , physical therapy (2 visits) and work restrictions.  Seeing María Elena today.  Work restrictions were honored from before.  Working tonight, Monday, Tuesday and then following Fri, Sat, Sunday. Felt sore in upper back during shift two night ago - no lifting, no reaching heavy objects. Doing med administration, hill, light duty activities.  Being compliant with home exercise.    Patient's past medical, surgical, social, and family histories were reviewed today and no changes are noted.    REVIEW OF SYSTEMS:  Constitutional: NEGATIVE for fever, chills, change in weight  Skin: NEGATIVE for worrisome rashes, moles or lesions  GI/: NEGATIVE for bowel or bladder changes  Neuro: NEGATIVE for weakness, dizziness or paresthesias    OBJECTIVE:  /83  Ht 5' 2\" (1.575 m)  Wt 125 lb (56.7 kg)  BMI 22.86 kg/m2   General: healthy, alert and in no distress  HEENT: no scleral icterus or conjunctival erythema  Skin: no suspicious lesions or rash. No jaundice.  CV:  no pedal edema  Resp: normal respiratory effort without conversational dyspnea   Psych: normal mood and affect  Gait: normal " steady gait with appropriate coordination and balance  Neuro: normal light touch sensory exam of the extremities.    MSK:  THORACIC/LUMBAR SPINE  Inspection:    No gross deformity/asymmetry  Palpation:    Mildly tender about the R lumbar facet joints (L4/5). Otherwise no longer tender over left para lumbar muscles, right para lumbar muscles, right SI joint and right sciatic notch. Otherwise remainder of landmarks are nontender.  Range of Motion:     Lumbar flexion full    Lumbar extension full    Patient's conditions were thoroughly discussed during today's visit with greater than 50% of the visit spent counseling the patient with total time spent face-to-face with the patient being 15 minutes.    Aliya Salas DO [facility]

## 2018-01-26 NOTE — PATIENT INSTRUCTIONS
1. Acute midline low back pain, with sciatica presence unspecified    2. Encounter related to worker's compensation claim      Discuss with María Elena about core strengthening / stability for long term use  Continue home exercise  Letter for work with gradual return to full duty    Follow up as needed. Call direct clinic number [[phone]] at any time with questions or concerns.

## 2018-01-26 NOTE — LETTER
_  [facility]  [address]  [address]  [place] [address]  Phone: [phone]  Fax: [phone]    January 26, 2018    Taisha Jaimes  1955  [address]  [place] [address]    Employer: [employer]  Date of Accident: 1/13/18  Work Related:  yes    To Whom It May Concern:    Taisha was seen in clinic today for follow up on back pain related to a work injury.    Taisha is making improvement would recommend the following restrictions for tonight's (1/26/18) shift.    A) Bend: Not at all (0 hours)  B) Squat: Not at all (0 hours)  C) Walk/Stand: Occasionally (1-3 hours)  D) Reach Above Shoulders: Occasionally (1-3 hours)  E) Lift, carry, push, and pull no more than:  0-10 lbs.  Other restrictions: No patient transfers     Beginning Monday, 1/29/18 through Sunday 2/4/18 would recommend the following:    A) Bend: Occasionally (1-3 hours)  B) Squat: Not at all (0 hours)  C) Walk/Stand: Occasionally (1-3 hours)  D) Reach Above Shoulders: Occasionally (1-3 hours)  E) Lift, carry, push, and pull no more than:  0-50 lbs.  Other restrictions: No lifting > 50 lbs    Therefore she may return to full duty.  If she is unable to return to full duty by 2/5/18 then she will need to be seen in follow-up otherwise we will assume maximum medical improvement.    Please feel free to contact myself or my Clinic Coordinator, Roel Mahoney, with any questions or concerns at the above number.    Sincerely,        Aliya Salas DO, [facility] on behalf of Dr. Salas

## 2018-03-09 ENCOUNTER — TELEPHONE (OUTPATIENT)
Dept: ORTHOPEDICS | Facility: CLINIC | Age: 63
End: 2018-03-09

## 2018-03-09 NOTE — TELEPHONE ENCOUNTER
Reason for Call:  Form, our goal is to have forms completed with 72 hours, however, some forms may require a visit or additional information.    Type of letter, form or note:  Health Care Provider Report    Who is the form from?: Insurance comp    Where did the form come from: form was mailed in    Phone number of person requesting form: [phone]  Can we leave a detailed message on this number: yes    Desired completion date of form: within 10 days      How will form be returned?:  fax to [phone]    Has the patient signed a consent form for release of information (may be included with form)? No needed    Additional comments: na    Form was started and place in Provider Basket for provider review/ completion at [facility].

## 2018-08-07 ENCOUNTER — RECORDS - HEALTHEAST (OUTPATIENT)
Dept: ADMINISTRATIVE | Facility: OTHER | Age: 63
End: 2018-08-07